# Patient Record
Sex: MALE | Race: WHITE
[De-identification: names, ages, dates, MRNs, and addresses within clinical notes are randomized per-mention and may not be internally consistent; named-entity substitution may affect disease eponyms.]

---

## 2020-10-19 ENCOUNTER — HOSPITAL ENCOUNTER (EMERGENCY)
Dept: HOSPITAL 26 - MED | Age: 35
Discharge: HOME | End: 2020-10-19
Payer: COMMERCIAL

## 2020-10-19 VITALS — BODY MASS INDEX: 37.1 KG/M2 | HEIGHT: 71 IN | WEIGHT: 265 LBS

## 2020-10-19 VITALS — SYSTOLIC BLOOD PRESSURE: 120 MMHG | DIASTOLIC BLOOD PRESSURE: 91 MMHG

## 2020-10-19 VITALS — DIASTOLIC BLOOD PRESSURE: 91 MMHG | SYSTOLIC BLOOD PRESSURE: 120 MMHG
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
## 2020-10-19 DIAGNOSIS — Z88.0: ICD-10-CM

## 2020-10-19 DIAGNOSIS — F17.210: ICD-10-CM
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
## 2020-10-19 DIAGNOSIS — S50.02XA: Primary | ICD-10-CM
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
## 2020-10-20 ENCOUNTER — HOSPITAL ENCOUNTER (EMERGENCY)
Dept: HOSPITAL 26 - MED | Age: 35
Discharge: LEFT BEFORE DISCHARGE | End: 2020-10-20
Payer: COMMERCIAL

## 2020-10-20 VITALS — WEIGHT: 265 LBS | HEIGHT: 71 IN | BODY MASS INDEX: 37.1 KG/M2

## 2020-10-20 VITALS — SYSTOLIC BLOOD PRESSURE: 165 MMHG | DIASTOLIC BLOOD PRESSURE: 104 MMHG

## 2020-10-20 DIAGNOSIS — J02.9: Primary | ICD-10-CM

## 2020-10-20 DIAGNOSIS — Z53.21: ICD-10-CM

## 2020-10-20 DIAGNOSIS — Z88.0: ICD-10-CM

## 2020-10-20 NOTE — NUR
PT LEFT WITHOUT BEING SEEN. ADVISED ME THAT HE JUST WANTS TO BE TESTED AND HE 
WILL GO TO THE DRIVE THROUGH COVID TEST.

## 2021-09-25 ENCOUNTER — HOSPITAL ENCOUNTER (EMERGENCY)
Dept: HOSPITAL 26 - MED | Age: 36
Discharge: LEFT BEFORE DISCHARGE | End: 2021-09-25
Payer: COMMERCIAL

## 2021-09-25 ENCOUNTER — HOSPITAL ENCOUNTER (EMERGENCY)
Dept: HOSPITAL 26 - MED | Age: 36
Discharge: HOME | End: 2021-09-25
Payer: COMMERCIAL

## 2021-09-25 VITALS — DIASTOLIC BLOOD PRESSURE: 97 MMHG | SYSTOLIC BLOOD PRESSURE: 187 MMHG

## 2021-09-25 VITALS — WEIGHT: 260 LBS | BODY MASS INDEX: 36.4 KG/M2 | HEIGHT: 71 IN

## 2021-09-25 VITALS — DIASTOLIC BLOOD PRESSURE: 100 MMHG | SYSTOLIC BLOOD PRESSURE: 149 MMHG

## 2021-09-25 DIAGNOSIS — R19.7: Primary | ICD-10-CM

## 2021-09-25 DIAGNOSIS — R11.2: Primary | ICD-10-CM

## 2021-09-25 DIAGNOSIS — I10: ICD-10-CM

## 2021-09-25 DIAGNOSIS — Z20.822: ICD-10-CM

## 2021-09-25 DIAGNOSIS — Z88.0: ICD-10-CM

## 2021-09-25 DIAGNOSIS — R53.1: ICD-10-CM

## 2021-09-25 DIAGNOSIS — F41.9: ICD-10-CM

## 2021-09-25 DIAGNOSIS — R51.9: ICD-10-CM

## 2021-09-25 LAB
ALBUMIN FLD-MCNC: 4.2 G/DL (ref 3.4–5)
ANION GAP SERPL CALCULATED.3IONS-SCNC: 15.5 MMOL/L (ref 8–16)
AST SERPL-CCNC: 20 U/L (ref 15–37)
BASOPHILS # BLD AUTO: 0.1 K/UL (ref 0–0.22)
BASOPHILS NFR BLD AUTO: 0.5 % (ref 0–2)
BILIRUB SERPL-MCNC: 0.3 MG/DL (ref 0–1)
BUN SERPL-MCNC: 10 MG/DL (ref 7–18)
CHLORIDE SERPL-SCNC: 101 MMOL/L (ref 98–107)
CO2 SERPL-SCNC: 25 MMOL/L (ref 21–32)
CREAT SERPL-MCNC: 1.5 MG/DL (ref 0.6–1.3)
EOSINOPHIL # BLD AUTO: 0 K/UL (ref 0–0.4)
EOSINOPHIL NFR BLD AUTO: 0.1 % (ref 0–4)
ERYTHROCYTE [DISTWIDTH] IN BLOOD BY AUTOMATED COUNT: 13.4 % (ref 11.6–13.7)
GFR SERPL CREATININE-BSD FRML MDRD: 67 ML/MIN (ref 90–?)
GLUCOSE SERPL-MCNC: 136 MG/DL (ref 74–106)
HCT VFR BLD AUTO: 47.2 % (ref 36–52)
HGB BLD-MCNC: 16.3 G/DL (ref 12–18)
LIPASE SERPL-CCNC: 87 U/L (ref 73–393)
LYMPHOCYTES # BLD AUTO: 0.8 K/UL (ref 2–11.5)
LYMPHOCYTES NFR BLD AUTO: 6 % (ref 20.5–51.1)
MCH RBC QN AUTO: 31 PG (ref 27–31)
MCHC RBC AUTO-ENTMCNC: 35 G/DL (ref 33–37)
MCV RBC AUTO: 89.7 FL (ref 80–94)
MONOCYTES # BLD AUTO: 1 K/UL (ref 0.8–1)
MONOCYTES NFR BLD AUTO: 7.5 % (ref 1.7–9.3)
NEUTROPHILS # BLD AUTO: 11 K/UL (ref 1.8–7.7)
NEUTROPHILS NFR BLD AUTO: 85.9 % (ref 42.2–75.2)
PLATELET # BLD AUTO: 193 K/UL (ref 140–450)
POTASSIUM SERPL-SCNC: 3.5 MMOL/L (ref 3.5–5.1)
RBC # BLD AUTO: 5.26 MIL/UL (ref 4.2–6.1)
SODIUM SERPL-SCNC: 138 MMOL/L (ref 136–145)
WBC # BLD AUTO: 12.9 K/UL (ref 4.8–10.8)

## 2021-09-25 PROCEDURE — 96361 HYDRATE IV INFUSION ADD-ON: CPT

## 2021-09-25 PROCEDURE — 85025 COMPLETE CBC W/AUTO DIFF WBC: CPT

## 2021-09-25 PROCEDURE — 36415 COLL VENOUS BLD VENIPUNCTURE: CPT

## 2021-09-25 PROCEDURE — 99283 EMERGENCY DEPT VISIT LOW MDM: CPT

## 2021-09-25 PROCEDURE — 80053 COMPREHEN METABOLIC PANEL: CPT

## 2021-09-25 PROCEDURE — 83690 ASSAY OF LIPASE: CPT

## 2021-09-25 PROCEDURE — U0003 INFECTIOUS AGENT DETECTION BY NUCLEIC ACID (DNA OR RNA); SEVERE ACUTE RESPIRATORY SYNDROME CORONAVIRUS 2 (SARS-COV-2) (CORONAVIRUS DISEASE [COVID-19]), AMPLIFIED PROBE TECHNIQUE, MAKING USE OF HIGH THROUGHPUT TECHNOLOGIES AS DESCRIBED BY CMS-2020-01-R: HCPCS

## 2021-09-25 PROCEDURE — 96360 HYDRATION IV INFUSION INIT: CPT

## 2021-09-25 NOTE — NUR
Patient discharged with v/s stable. Written and verbal after care instructions 
given and explained. 

Patient verbalized understanding. Ambulatory with steady gait. ID band removed. 
All questions addressed prior to discharge. Advised to follow up with PMD.

## 2023-10-20 ENCOUNTER — HOSPITAL ENCOUNTER (EMERGENCY)
Dept: HOSPITAL 26 - MED | Age: 38
Discharge: LEFT BEFORE DISCHARGE | End: 2023-10-20
Payer: COMMERCIAL

## 2023-10-20 VITALS — HEIGHT: 71 IN | WEIGHT: 250 LBS | BODY MASS INDEX: 35 KG/M2

## 2023-10-20 VITALS
TEMPERATURE: 97.2 F | OXYGEN SATURATION: 98 % | DIASTOLIC BLOOD PRESSURE: 93 MMHG | SYSTOLIC BLOOD PRESSURE: 142 MMHG | RESPIRATION RATE: 18 BRPM | HEART RATE: 105 BPM

## 2023-10-20 DIAGNOSIS — M25.572: Primary | ICD-10-CM

## 2023-10-20 DIAGNOSIS — Z79.899: ICD-10-CM

## 2023-10-20 DIAGNOSIS — I10: ICD-10-CM

## 2023-10-20 DIAGNOSIS — Z88.0: ICD-10-CM

## 2023-10-20 PROCEDURE — 99284 EMERGENCY DEPT VISIT MOD MDM: CPT

## 2023-10-20 PROCEDURE — 96372 THER/PROPH/DIAG INJ SC/IM: CPT

## 2023-10-20 PROCEDURE — 73590 X-RAY EXAM OF LOWER LEG: CPT

## 2023-10-20 PROCEDURE — 73610 X-RAY EXAM OF ANKLE: CPT

## 2023-10-25 ENCOUNTER — HOSPITAL ENCOUNTER (EMERGENCY)
Dept: HOSPITAL 26 - MED | Age: 38
Discharge: HOME | End: 2023-10-25
Payer: COMMERCIAL

## 2023-10-25 VITALS
SYSTOLIC BLOOD PRESSURE: 150 MMHG | HEART RATE: 94 BPM | RESPIRATION RATE: 20 BRPM | OXYGEN SATURATION: 99 % | TEMPERATURE: 97.7 F | DIASTOLIC BLOOD PRESSURE: 101 MMHG

## 2023-10-25 VITALS — HEIGHT: 71 IN | BODY MASS INDEX: 35.03 KG/M2 | WEIGHT: 250.25 LBS

## 2023-10-25 VITALS — OXYGEN SATURATION: 99 %

## 2023-10-25 DIAGNOSIS — F10.10: ICD-10-CM

## 2023-10-25 DIAGNOSIS — Z88.0: ICD-10-CM
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
## 2023-10-25 DIAGNOSIS — F41.9: ICD-10-CM

## 2023-10-25 DIAGNOSIS — R60.9: Primary | ICD-10-CM

## 2023-10-25 LAB
ALBUMIN FLD-MCNC: 3.4 G/DL (ref 3.4–5)
ALP SERPL-CCNC: 79 U/L (ref 50–136)
ALT SERPL-CCNC: 22 U/L (ref 12–78)
ANION GAP SERPL CALCULATED.3IONS-SCNC: 8.3 MMOL/L (ref 8–16)
APPEARANCE UR: CLEAR
AST SERPL-CCNC: 18 U/L (ref 15–37)
BASOPHILS # BLD AUTO: 0 K/UL (ref 0–0.22)
BASOPHILS NFR BLD AUTO: 0.4 % (ref 0–2)
BILIRUB SERPL-MCNC: 0.2 MG/DL (ref 0–1)
BILIRUB UR QL STRIP: NEGATIVE
BUN SERPL-MCNC: 11 MG/DL (ref 7–18)
CALCIUM SPEC-MCNC: 8.6 MG/DL (ref 8.5–10.1)
CHLORIDE SERPL-SCNC: 107 MMOL/L (ref 98–107)
CO2 SERPL-SCNC: 29.9 MMOL/L (ref 21–32)
COLOR UR: YELLOW
CREAT SERPL-MCNC: 1 MG/DL (ref 0.6–1.3)
EOSINOPHIL # BLD AUTO: 0.1 K/UL (ref 0–0.4)
EOSINOPHIL NFR BLD AUTO: 2.1 % (ref 0–4)
ERYTHROCYTE [DISTWIDTH] IN BLOOD BY AUTOMATED COUNT: 14.1 % (ref 11.6–13.7)
GFR SERPL CREATININE-BSD FRML MDRD: 106 ML/MIN (ref 90–?)
GLUCOSE SERPL-MCNC: 99 MG/DL (ref 74–106)
GLUCOSE UR STRIP-MCNC: NEGATIVE MG/DL
HCT VFR BLD AUTO: 43.8 % (ref 36–52)
HGB BLD-MCNC: 14.6 G/DL (ref 12–18)
HGB UR QL STRIP: NEGATIVE
LEUKOCYTE ESTERASE UR QL STRIP: NEGATIVE
LYMPHOCYTES # BLD AUTO: 1.5 K/UL (ref 2–11.5)
LYMPHOCYTES NFR BLD AUTO: 22.4 % (ref 20.5–51.1)
MCH RBC QN AUTO: 29 PG (ref 27–31)
MCHC RBC AUTO-ENTMCNC: 33 G/DL (ref 33–37)
MCV RBC AUTO: 88.2 FL (ref 80–94)
MONOCYTES # BLD AUTO: 0.6 K/UL (ref 0.8–1)
MONOCYTES NFR BLD AUTO: 8.6 % (ref 1.7–9.3)
NEUTROPHILS # BLD AUTO: 4.4 K/UL (ref 1.8–7.7)
NEUTROPHILS NFR BLD AUTO: 66.5 % (ref 42.2–75.2)
NITRITE UR QL STRIP: NEGATIVE
PH UR STRIP: 7.5 [PH] (ref 5–9)
PLATELET # BLD AUTO: 189 K/UL (ref 140–450)
POTASSIUM SERPL-SCNC: 4.2 MMOL/L (ref 3.5–5.1)
PROT SERPL-MCNC: 7.1 G/DL (ref 6.4–8.2)
PROT UR QL STRIP: NEGATIVE
RBC # BLD AUTO: 4.97 MIL/UL (ref 4.2–6.1)
SODIUM SERPL-SCNC: 141 MMOL/L (ref 136–145)
SP GR UR STRIP: 1.02 (ref 1–1.03)
UROBILINOGEN UR STRIP-MCNC: 0.2 EU/DL (ref 0.2–1)
WBC # BLD AUTO: 6.7 K/UL (ref 4.8–10.8)

## 2024-12-30 NOTE — NUR
Subjective:       Patient ID: Timothy Ortega is a 70 y.o. male.    Chief Complaint: Foot Pain (Right foot pain: c/o pain rate 3/10, pt is diabetic, pt states of a sweat gland, when walking pain increases.)      HPI: Patient presents to the office today with the chief complaint of pain plantar aspect right foot with ambulation.  Does have history recurrent porokeratotic lesions present.  Ambulation causes 3/10 pain.  Utilizing gel inserts in his shoes to add additional cushioning.  Not currently performing any topical medications.  This patient is a Diabetic Type II, complicated with Peripheral Neuropathy. Patient does follow with Primary Care and/or Endocrinology for management of Diabetes Mellitus. This patient's PMD is Shlomo Ochoa MD. This patient last saw his/her primary care provider on 10/10/2024.     Hemoglobin A1C   Date Value Ref Range Status   12/02/2024 6.7 (H) 4.0 - 5.6 % Final     Comment:     ADA Screening Guidelines:  5.7-6.4%  Consistent with prediabetes  >or=6.5%  Consistent with diabetes    High levels of fetal hemoglobin interfere with the HbA1C  assay. Heterozygous hemoglobin variants (HbS, HgC, etc)do  not significantly interfere with this assay.   However, presence of multiple variants may affect accuracy.     09/20/2024 6.4 (H) 4.0 - 5.6 % Final     Comment:     ADA Screening Guidelines:  5.7-6.4%  Consistent with prediabetes  >or=6.5%  Consistent with diabetes    High levels of fetal hemoglobin interfere with the HbA1C  assay. Heterozygous hemoglobin variants (HbS, HgC, etc)do  not significantly interfere with this assay.   However, presence of multiple variants may affect accuracy.     08/01/2024 6.6 (H) 4.0 - 5.6 % Final     Comment:     ADA Screening Guidelines:  5.7-6.4%  Consistent with prediabetes  >or=6.5%  Consistent with diabetes    High levels of fetal hemoglobin interfere with the HbA1C  assay. Heterozygous hemoglobin variants (HbS, HgC, etc)do  not significantly  ANNEMARIE Aldrich at bedside for re-examination of patient interfere with this assay.   However, presence of multiple variants may affect accuracy.     .     Review of patient's allergies indicates:  No Known Allergies    Past Medical History:   Diagnosis Date    Coronary artery disease     [facility] - no stents    DM (diabetes mellitus)      lunch 10/28/2016    DM (diabetes mellitus)      am 2021    DM (diabetes mellitus)     BS 96 am 2022 Insulin for years    Groin pain, left 2021    Hyperlipemia     Hypertension     Kidney stones     Nephrolithiasis 2021    Neuropathy     Type 2 diabetes mellitus 10-12 years     am 10/31/2014       Family History   Problem Relation Name Age of Onset    Diabetes Mother      Glaucoma Mother      Heart disease Mother  75        CAB    Diabetes Father      Heart attack Father  58        Fatal MI    Diabetes Brother      Diabetes Sister      Diabetes Sister      Cataracts Paternal Uncle      Cataracts Maternal Grandmother         Social History     Socioeconomic History    Marital status:    Tobacco Use    Smoking status: Former     Current packs/day: 0.00     Average packs/day: 1 pack/day for 20.0 years (20.0 ttl pk-yrs)     Types: Cigarettes     Start date: 1980     Quit date: 2000     Years since quittin.0     Passive exposure: Past    Smokeless tobacco: Never   Substance and Sexual Activity    Alcohol use: No     Alcohol/week: 0.0 standard drinks of alcohol    Drug use: No    Sexual activity: Yes     Partners: Female   Social History Narrative    . Lives with spouse. Has 2 children. Retired. No pets or smokers in household.     Social Drivers of Health     Financial Resource Strain: Patient Declined (2024)    Overall Financial Resource Strain (CARDIA)     Difficulty of Paying Living Expenses: Patient declined   Food Insecurity: Patient Declined (2024)    Hunger Vital Sign     Worried About Running Out of Food in the Last Year: Patient declined     Ran Out of  Food in the Last Year: Patient declined   Transportation Needs: No Transportation Needs (5/29/2024)    TRANSPORTATION NEEDS     Transportation : No   Physical Activity: Insufficiently Active (5/23/2024)    Exercise Vital Sign     Days of Exercise per Week: 1 day     Minutes of Exercise per Session: 20 min   Stress: No Stress Concern Present (5/23/2024)    [place] citizen [employer] - Occupational Stress Questionnaire     Feeling of Stress : Not at all   Housing Stability: Unknown (5/23/2024)    Housing Stability Vital Sign     Unable to Pay for Housing in the Last Year: Patient declined       Past Surgical History:   Procedure Laterality Date    ANGIOGRAM, CORONARY, WITH LEFT HEART CATHETERIZATION N/A 5/28/2024    Procedure: Angiogram, Coronary, with Left Heart Cath;  Surgeon: Rojelio Wadsworth MD;  Location: [facility] CATH LAB;  Service: Cardiology;  Laterality: N/A;    CARPAL TUNNEL RELEASE Right 07/2020    COLONOSCOPY N/A 03/31/2017    Procedure: COLONOSCOPY;  Surgeon: Cornelius Ibarra MD;  Location: [facility];  Service: Endoscopy;  Laterality: N/A;    COLONOSCOPY N/A 04/18/2022    Procedure: COLONOSCOPY;  Surgeon: Geneva Serna MD;  Location: [facility];  Service: Endoscopy;  Laterality: N/A;    DENTAL SURGERY      Implant    HAND SURGERY      KNEE ARTHROSCOPY Left     LITHOTRIPSY, CORONARY TRANSLUMINAL, PERCUTANEOUS  5/28/2024    Procedure: LITHOTRIPSY, CORONARY TRANSLUMINAL, PERCUTANEOUS;  Surgeon: Rojelio Wadsworth MD;  Location: [facility] CATH LAB;  Service: Cardiology;;    PTCA, SINGLE VESSEL  5/28/2024    Procedure: PTCA, Single Vessel;  Surgeon: Rojelio Wadsworth MD;  Location: [facility] CATH LAB;  Service: Cardiology;;    ROBOT-ASSISTED LAPAROSCOPIC REPAIR OF INGUINAL HERNIA USING DA JOSE ANGEL XI Left 02/26/2021    Procedure: XI ROBOTIC REPAIR, HERNIA, INGUINAL;  Surgeon: Tavon Cruz MD;  Location: [facility] OR;  Service: General;  Laterality: Left;    STENT, DRUG ELUTING, SINGLE VESSEL, CORONARY   5/28/2024    Procedure: Stent, Drug Eluting, Single Vessel, Coronary;  Surgeon: Rojelio Wadsworth MD;  Location: [facility] CATH LAB;  Service: Cardiology;;       Review of Systems       Objective:   There were no vitals taken for this visit.    Physical Exam  LOWER EXTREMITY PHYSICAL EXAMINATION    VASCULAR:  The right dorsalis pedis pulse 2/4 and the right posterior tibial pulse 2/4.  The left dorsalis pedis pulse 2/4 and posterior tibial pulse on the left is 2/4.  Capillary refill is intact.  Pedal hair growth intact    DERMATOLOGY:  Porokeratosis present to the plantar aspect of the right 5th metatarsal head.  Plantar fat pad atrophy noted.  No signs of underlying ulceration present.  No signs of acute infection.  No underlying bony prominences    ORTHOPEDIC: Manual Muscle Testing is 5/5 in all planes on the left and right, without pains, with and without resistance. Gait pattern is non-antalgic.    Assessment:     1. Porokeratosis    2. Plantar fat pad atrophy    3. Type 2 diabetes mellitus with other neurologic complication, with long-term current use of insulin          Plan:     Porokeratosis    Plantar fat pad atrophy    Type 2 diabetes mellitus with other neurologic complication, with long-term current use of insulin        Thorough discussion is had with the patient today, concerning the diagnosis, its etiology, and the treatment algorithm at present.    We discussed the etiology and pathology associated with acquired plantar porokeratosis.  We discussed the importance of removing the centralize core and alleviating pain discomfort.  We also discussed utilizing a pumice stone at home to reduce callus formation and subsequent recurrence of this area.  Recommend to apply hydrating creams and lotions this area daily to ensure that there is no subsequent buildup.    Porokeratotic skin formation, as outlined within the examination portion of this note, is surgically debrided with sharp #10/#15 blade, to alleviate  discomfort with weight bearing and ambulation, and to lessen the possibility of skin complications, e.g., ulceration due to pressure. No ulceration(s) is are noted with/post debridement. The lesion is completed healed and resolved. No evidence of infection.     Continue to utilize padding in shoe gear to add additional cushioning and prevent recurrent callusing/porokeratotic lesions as well as protection of the plantar metatarsal heads due to plantar fat pad atrophy      Future Appointments   Date Time Provider Department Center   2/10/2025  8:00 AM Shy Rome PA-C [facility] INT PANCHITO [facility]   3/7/2025  8:30 AM Al Daugherty PA-C [facility] DIABETE [facility]   3/21/2025 10:30 AM Lejeune, Elizabeth B, NP [facility] PULMSVC [facility]   4/3/2025  9:20 AM LABORATORY, [facility]   4/10/2025  9:20 AM Shlomo Ochoa MD [facility] IM [facility]   5/13/2025  8:40 AM Naveen Gr MD [facility] CARDIO [facility]   7/11/2025  9:35 AM LABORATORY, [facility] LAB [facility]   7/17/2025  8:15 AM Ronny Morales MD [facility] UROLOGY [facility]